# Patient Record
Sex: FEMALE | URBAN - METROPOLITAN AREA
[De-identification: names, ages, dates, MRNs, and addresses within clinical notes are randomized per-mention and may not be internally consistent; named-entity substitution may affect disease eponyms.]

---

## 2024-09-24 ENCOUNTER — DOCUMENTATION (OUTPATIENT)
Dept: ENDOCRINOLOGY | Facility: CLINIC | Age: 29
End: 2024-09-24
Payer: MEDICAID

## 2024-09-24 ENCOUNTER — PREP FOR PROCEDURE (OUTPATIENT)
Dept: ENDOCRINOLOGY | Facility: CLINIC | Age: 29
End: 2024-09-24
Payer: MEDICAID

## 2024-09-24 DIAGNOSIS — D25.9 UTERINE LEIOMYOMA, UNSPECIFIED LOCATION: ICD-10-CM

## 2024-09-24 DIAGNOSIS — Z01.812 ENCOUNTER FOR PREPROCEDURAL LABORATORY EXAMINATION: ICD-10-CM

## 2024-09-24 DIAGNOSIS — D25.0 SUBMUCOUS MYOMA OF UTERUS: Primary | ICD-10-CM

## 2024-09-24 DIAGNOSIS — Z01.812 ENCOUNTER FOR PREPROCEDURAL LABORATORY EXAMINATION: Primary | ICD-10-CM

## 2024-09-24 DIAGNOSIS — Z31.41 FERTILITY TESTING: Primary | ICD-10-CM

## 2024-09-24 RX ORDER — GABAPENTIN 600 MG/1
600 TABLET ORAL ONCE
OUTPATIENT
Start: 2024-09-24 | End: 2024-09-24

## 2024-09-24 RX ORDER — ACETAMINOPHEN 325 MG/1
975 TABLET ORAL ONCE
OUTPATIENT
Start: 2024-09-24 | End: 2024-09-24

## 2024-09-24 RX ORDER — SODIUM CHLORIDE, SODIUM LACTATE, POTASSIUM CHLORIDE, CALCIUM CHLORIDE 600; 310; 30; 20 MG/100ML; MG/100ML; MG/100ML; MG/100ML
20 INJECTION, SOLUTION INTRAVENOUS CONTINUOUS
OUTPATIENT
Start: 2024-09-24

## 2024-09-24 RX ORDER — CELECOXIB 400 MG/1
400 CAPSULE ORAL ONCE
OUTPATIENT
Start: 2024-09-24 | End: 2024-09-24

## 2024-09-24 NOTE — PROGRESS NOTES
Trinh Jasso [phone]     : 1995    2024    Telephone call to patient. She connected with Betty Torres to review her case and has relatives in [place] - she is interested in a female JOHNNIE perspective.   Received MRI report received via email. She has a large submucous fibroid and she really does not want a laparoscopic surgery.   Has been rx'd Lysteda - reviewed that this does not cause fibroid to regress. She has not taken it.     30yo  (hx SAB x2, one this past ). No hx D&C. Heavy bleeding with her SAB.   Hx  x 3- conceived coming off OCPs.   Has noted increasing bleeding since last summer. She is currently using a diaphragm and spermicide.   PMH: Healthy  PSH: None  All: reaction to dicloxacillin - never officially tested for PCN allergy  Meds: None   BMI: 5'4'' and 150 lbs- has noted some weight gain recently, has never been tested for PCOS    PLAN  Reviewed staged procedure if she desires to avoid laparoscopy.   Reviewed one stop with HS and LS versus staged HS x 2- will place surgery orders for hysteroscopic myomectomy and see whether insurance will cover at .   Reviewed  or Friday options- she will consider  Rejoni clinical trial for adhesion prevention. Will ask study coordinators to reach out. Will do SIS day before her surgery for preop planning. Orders placed.     Mariana Cornejo MD      On Aug 28, 2024, at 1:14?PM, Dat Doss <[email or web address]> wrote:  ?   Dear Dr. Cornejo, Hope all is well with you and your summer went nicely. This patient has a fibroid that her doctor in [place] wants her to take out vaginally if possible and if not laparoscopically. I would like her to get a second opinion  Dear Dr. Cornejo,     Hope all is well with you and your summer went nicely. This patient has a fibroid that her doctor in [place] wants her to take out vaginally if possible and if not laparoscopically. I would like her to get a second opinion from you and  possibly have you do the surgery as to preserve her chances of fertility as much as possible. I am sending you her mri images in another email as well as the report. Can you take a look and let me know if this is a case that you are willing to take on and have her come to [place]? Thank  you and have a great day. Please be on the lookout for the two additional emails.  Chen Mcclure

## 2024-09-25 PROBLEM — D25.0 SUBMUCOUS MYOMA OF UTERUS: Status: ACTIVE | Noted: 2024-09-24

## 2024-10-02 ENCOUNTER — TELEPHONE (OUTPATIENT)
Dept: ENDOCRINOLOGY | Facility: CLINIC | Age: 29
End: 2024-10-02

## 2024-10-02 NOTE — TELEPHONE ENCOUNTER
Called the patient there was no answer Left a message to call the office    NICOLAS CHOWDHURY 10/02/24 8:13 AM

## 2024-10-07 ENCOUNTER — TELEPHONE (OUTPATIENT)
Dept: ENDOCRINOLOGY | Facility: CLINIC | Age: 29
End: 2024-10-07
Payer: COMMERCIAL

## 2024-10-07 NOTE — TELEPHONE ENCOUNTER
Caller: Trinh  Reason for call: Returning nurse Vanna CARRANZA call from 10/3/24 please call her back  Notes:

## 2024-10-07 NOTE — TELEPHONE ENCOUNTER
Called the patient she verified her name and date of birth I advised we do not who called Patient verbalized an understanding    NICOLAS CHOWDHURY 10/07/24 10:27 AM

## 2024-10-16 ENCOUNTER — DOCUMENTATION (OUTPATIENT)
Dept: OBSTETRICS AND GYNECOLOGY | Facility: CLINIC | Age: 29
End: 2024-10-16
Payer: COMMERCIAL

## 2024-10-16 NOTE — RESEARCH NOTES
Patient consented to participate in Juveena Hydrogel study on 10/10/24. Patient indicated that she wanted her primary care physician to be made aware of her study participation. JHOAN Bass of [employer] in [place] was sent a message through [employer] website on 10/16/24 notifying her of the patient's research participation.

## 2024-10-28 DIAGNOSIS — Z00.6 RESEARCH EXAM: ICD-10-CM

## 2024-10-31 ENCOUNTER — OFFICE VISIT (OUTPATIENT)
Dept: ENDOCRINOLOGY | Facility: CLINIC | Age: 29
End: 2024-10-31
Payer: COMMERCIAL

## 2024-10-31 ENCOUNTER — LAB (OUTPATIENT)
Dept: LAB | Facility: LAB | Age: 29
End: 2024-10-31
Payer: COMMERCIAL

## 2024-10-31 DIAGNOSIS — Z00.6 RESEARCH EXAM: ICD-10-CM

## 2024-10-31 DIAGNOSIS — Z01.812 ENCOUNTER FOR PREPROCEDURAL LABORATORY EXAMINATION: ICD-10-CM

## 2024-10-31 DIAGNOSIS — D25.9 UTERINE LEIOMYOMA, UNSPECIFIED LOCATION: Primary | ICD-10-CM

## 2024-10-31 LAB
ALBUMIN SERPL BCP-MCNC: 4.5 G/DL (ref 3.4–5)
ALP SERPL-CCNC: 88 U/L (ref 33–110)
ALT SERPL W P-5'-P-CCNC: 22 U/L (ref 7–45)
ANION GAP SERPL CALC-SCNC: 8 MMOL/L (ref 10–20)
AST SERPL W P-5'-P-CCNC: 20 U/L (ref 9–39)
BASOPHILS # BLD AUTO: 0.04 X10*3/UL (ref 0–0.1)
BASOPHILS NFR BLD AUTO: 0.5 %
BILIRUB SERPL-MCNC: 0.7 MG/DL (ref 0–1.2)
BUN SERPL-MCNC: 11 MG/DL (ref 6–23)
CALCIUM SERPL-MCNC: 9 MG/DL (ref 8.6–10.3)
CHLORIDE SERPL-SCNC: 104 MMOL/L (ref 98–107)
CO2 SERPL-SCNC: 31 MMOL/L (ref 21–32)
CREAT SERPL-MCNC: 0.69 MG/DL (ref 0.5–1.05)
EGFRCR SERPLBLD CKD-EPI 2021: >90 ML/MIN/1.73M*2
EOSINOPHIL # BLD AUTO: 0.09 X10*3/UL (ref 0–0.7)
EOSINOPHIL NFR BLD AUTO: 1.2 %
ERYTHROCYTE [DISTWIDTH] IN BLOOD BY AUTOMATED COUNT: 12.7 % (ref 11.5–14.5)
GLUCOSE SERPL-MCNC: 116 MG/DL (ref 74–99)
HCT VFR BLD AUTO: 38.9 % (ref 36–46)
HGB BLD-MCNC: 12.7 G/DL (ref 12–16)
IMM GRANULOCYTES # BLD AUTO: 0.03 X10*3/UL (ref 0–0.7)
IMM GRANULOCYTES NFR BLD AUTO: 0.4 % (ref 0–0.9)
LYMPHOCYTES # BLD AUTO: 1.75 X10*3/UL (ref 1.2–4.8)
LYMPHOCYTES NFR BLD AUTO: 23.6 %
MCH RBC QN AUTO: 28.9 PG (ref 26–34)
MCHC RBC AUTO-ENTMCNC: 32.6 G/DL (ref 32–36)
MCV RBC AUTO: 89 FL (ref 80–100)
MONOCYTES # BLD AUTO: 0.46 X10*3/UL (ref 0.1–1)
MONOCYTES NFR BLD AUTO: 6.2 %
NEUTROPHILS # BLD AUTO: 5.05 X10*3/UL (ref 1.2–7.7)
NEUTROPHILS NFR BLD AUTO: 68.1 %
NRBC BLD-RTO: 0 /100 WBCS (ref 0–0)
PLATELET # BLD AUTO: 233 X10*3/UL (ref 150–450)
POTASSIUM SERPL-SCNC: 3.9 MMOL/L (ref 3.5–5.3)
PREGNANCY TEST URINE, POC: NEGATIVE
PROT SERPL-MCNC: 7.1 G/DL (ref 6.4–8.2)
RBC # BLD AUTO: 4.39 X10*6/UL (ref 4–5.2)
SODIUM SERPL-SCNC: 139 MMOL/L (ref 136–145)
WBC # BLD AUTO: 7.4 X10*3/UL (ref 4.4–11.3)

## 2024-10-31 PROCEDURE — 58340 CATHETER FOR HYSTEROGRAPHY: CPT

## 2024-10-31 PROCEDURE — 76831 ECHO EXAM UTERUS: CPT

## 2024-10-31 PROCEDURE — 76831 ECHO EXAM UTERUS: CPT | Performed by: STUDENT IN AN ORGANIZED HEALTH CARE EDUCATION/TRAINING PROGRAM

## 2024-10-31 PROCEDURE — 85025 COMPLETE CBC W/AUTO DIFF WBC: CPT

## 2024-10-31 PROCEDURE — 80053 COMPREHEN METABOLIC PANEL: CPT

## 2024-11-01 ENCOUNTER — PHARMACY VISIT (OUTPATIENT)
Dept: PHARMACY | Facility: CLINIC | Age: 29
End: 2024-11-01
Payer: COMMERCIAL

## 2024-11-01 ENCOUNTER — ANESTHESIA (OUTPATIENT)
Dept: OPERATING ROOM | Facility: HOSPITAL | Age: 29
End: 2024-11-01
Payer: COMMERCIAL

## 2024-11-01 ENCOUNTER — HOSPITAL ENCOUNTER (OUTPATIENT)
Facility: HOSPITAL | Age: 29
Setting detail: OUTPATIENT SURGERY
Discharge: HOME | End: 2024-11-01
Attending: OBSTETRICS & GYNECOLOGY | Admitting: OBSTETRICS & GYNECOLOGY
Payer: COMMERCIAL

## 2024-11-01 ENCOUNTER — ANESTHESIA EVENT (OUTPATIENT)
Dept: OPERATING ROOM | Facility: HOSPITAL | Age: 29
End: 2024-11-01
Payer: COMMERCIAL

## 2024-11-01 VITALS
RESPIRATION RATE: 16 BRPM | SYSTOLIC BLOOD PRESSURE: 116 MMHG | TEMPERATURE: 97 F | HEIGHT: 65 IN | BODY MASS INDEX: 26.67 KG/M2 | HEART RATE: 62 BPM | OXYGEN SATURATION: 96 % | WEIGHT: 160.05 LBS | DIASTOLIC BLOOD PRESSURE: 68 MMHG

## 2024-11-01 DIAGNOSIS — G89.18 POST-OP PAIN: ICD-10-CM

## 2024-11-01 DIAGNOSIS — D25.0 SUBMUCOUS MYOMA OF UTERUS: Primary | ICD-10-CM

## 2024-11-01 LAB — PREGNANCY TEST URINE, POC: NEGATIVE

## 2024-11-01 PROCEDURE — 3700000002 HC GENERAL ANESTHESIA TIME - EACH INCREMENTAL 1 MINUTE: Performed by: OBSTETRICS & GYNECOLOGY

## 2024-11-01 PROCEDURE — RXMED WILLOW AMBULATORY MEDICATION CHARGE

## 2024-11-01 PROCEDURE — 2500000004 HC RX 250 GENERAL PHARMACY W/ HCPCS (ALT 636 FOR OP/ED): Performed by: ANESTHESIOLOGY

## 2024-11-01 PROCEDURE — 7100000009 HC PHASE TWO TIME - INITIAL BASE CHARGE: Performed by: OBSTETRICS & GYNECOLOGY

## 2024-11-01 PROCEDURE — 7100000010 HC PHASE TWO TIME - EACH INCREMENTAL 1 MINUTE: Performed by: OBSTETRICS & GYNECOLOGY

## 2024-11-01 PROCEDURE — 7100000001 HC RECOVERY ROOM TIME - INITIAL BASE CHARGE: Performed by: OBSTETRICS & GYNECOLOGY

## 2024-11-01 PROCEDURE — 3600000003 HC OR TIME - INITIAL BASE CHARGE - PROCEDURE LEVEL THREE: Performed by: OBSTETRICS & GYNECOLOGY

## 2024-11-01 PROCEDURE — 2500000004 HC RX 250 GENERAL PHARMACY W/ HCPCS (ALT 636 FOR OP/ED): Performed by: OBSTETRICS & GYNECOLOGY

## 2024-11-01 PROCEDURE — 2720000007 HC OR 272 NO HCPCS: Performed by: OBSTETRICS & GYNECOLOGY

## 2024-11-01 PROCEDURE — 3700000001 HC GENERAL ANESTHESIA TIME - INITIAL BASE CHARGE: Performed by: OBSTETRICS & GYNECOLOGY

## 2024-11-01 PROCEDURE — 2500000005 HC RX 250 GENERAL PHARMACY W/O HCPCS: Performed by: OBSTETRICS & GYNECOLOGY

## 2024-11-01 PROCEDURE — 2500000004 HC RX 250 GENERAL PHARMACY W/ HCPCS (ALT 636 FOR OP/ED): Mod: JZ | Performed by: OBSTETRICS & GYNECOLOGY

## 2024-11-01 PROCEDURE — 7100000002 HC RECOVERY ROOM TIME - EACH INCREMENTAL 1 MINUTE: Performed by: OBSTETRICS & GYNECOLOGY

## 2024-11-01 PROCEDURE — 58561 HYSTEROSCOPY REMOVE MYOMA: CPT | Performed by: OBSTETRICS & GYNECOLOGY

## 2024-11-01 PROCEDURE — 81025 URINE PREGNANCY TEST: CPT | Performed by: OBSTETRICS & GYNECOLOGY

## 2024-11-01 PROCEDURE — 2500000001 HC RX 250 WO HCPCS SELF ADMINISTERED DRUGS (ALT 637 FOR MEDICARE OP): Performed by: OBSTETRICS & GYNECOLOGY

## 2024-11-01 PROCEDURE — 3600000008 HC OR TIME - EACH INCREMENTAL 1 MINUTE - PROCEDURE LEVEL THREE: Performed by: OBSTETRICS & GYNECOLOGY

## 2024-11-01 PROCEDURE — 2500000004 HC RX 250 GENERAL PHARMACY W/ HCPCS (ALT 636 FOR OP/ED)

## 2024-11-01 RX ORDER — GABAPENTIN 300 MG/1
600 CAPSULE ORAL ONCE
Status: COMPLETED | OUTPATIENT
Start: 2024-11-01 | End: 2024-11-01

## 2024-11-01 RX ORDER — ONDANSETRON HYDROCHLORIDE 2 MG/ML
INJECTION, SOLUTION INTRAVENOUS AS NEEDED
Status: DISCONTINUED | OUTPATIENT
Start: 2024-11-01 | End: 2024-11-01

## 2024-11-01 RX ORDER — SODIUM CHLORIDE, SODIUM LACTATE, POTASSIUM CHLORIDE, CALCIUM CHLORIDE 600; 310; 30; 20 MG/100ML; MG/100ML; MG/100ML; MG/100ML
20 INJECTION, SOLUTION INTRAVENOUS CONTINUOUS
Status: DISCONTINUED | OUTPATIENT
Start: 2024-11-01 | End: 2024-11-01 | Stop reason: HOSPADM

## 2024-11-01 RX ORDER — FENTANYL CITRATE 50 UG/ML
INJECTION, SOLUTION INTRAMUSCULAR; INTRAVENOUS AS NEEDED
Status: DISCONTINUED | OUTPATIENT
Start: 2024-11-01 | End: 2024-11-01

## 2024-11-01 RX ORDER — FENTANYL CITRATE 50 UG/ML
12.5 INJECTION, SOLUTION INTRAMUSCULAR; INTRAVENOUS EVERY 5 MIN PRN
Status: DISCONTINUED | OUTPATIENT
Start: 2024-11-01 | End: 2024-11-01 | Stop reason: HOSPADM

## 2024-11-01 RX ORDER — ROCURONIUM BROMIDE 10 MG/ML
INJECTION, SOLUTION INTRAVENOUS AS NEEDED
Status: DISCONTINUED | OUTPATIENT
Start: 2024-11-01 | End: 2024-11-01

## 2024-11-01 RX ORDER — IBUPROFEN 600 MG/1
600 TABLET ORAL EVERY 6 HOURS PRN
Qty: 20 TABLET | Refills: 0 | Status: SHIPPED | OUTPATIENT
Start: 2024-11-01

## 2024-11-01 RX ORDER — DROPERIDOL 2.5 MG/ML
0.62 INJECTION, SOLUTION INTRAMUSCULAR; INTRAVENOUS ONCE AS NEEDED
Status: DISCONTINUED | OUTPATIENT
Start: 2024-11-01 | End: 2024-11-01 | Stop reason: HOSPADM

## 2024-11-01 RX ORDER — FENTANYL CITRATE 50 UG/ML
50 INJECTION, SOLUTION INTRAMUSCULAR; INTRAVENOUS EVERY 5 MIN PRN
Status: DISCONTINUED | OUTPATIENT
Start: 2024-11-01 | End: 2024-11-01 | Stop reason: HOSPADM

## 2024-11-01 RX ORDER — KETOROLAC TROMETHAMINE 30 MG/ML
INJECTION, SOLUTION INTRAMUSCULAR; INTRAVENOUS AS NEEDED
Status: DISCONTINUED | OUTPATIENT
Start: 2024-11-01 | End: 2024-11-01

## 2024-11-01 RX ORDER — CELECOXIB 200 MG/1
400 CAPSULE ORAL ONCE
Status: COMPLETED | OUTPATIENT
Start: 2024-11-01 | End: 2024-11-01

## 2024-11-01 RX ORDER — ACETAMINOPHEN 325 MG/1
975 TABLET ORAL ONCE
Status: COMPLETED | OUTPATIENT
Start: 2024-11-01 | End: 2024-11-01

## 2024-11-01 RX ORDER — ACETAMINOPHEN 325 MG/1
650 TABLET ORAL EVERY 6 HOURS PRN
Qty: 20 TABLET | Refills: 0 | Status: SHIPPED | OUTPATIENT
Start: 2024-11-01

## 2024-11-01 RX ORDER — HYDROMORPHONE HYDROCHLORIDE 1 MG/ML
INJECTION, SOLUTION INTRAMUSCULAR; INTRAVENOUS; SUBCUTANEOUS AS NEEDED
Status: DISCONTINUED | OUTPATIENT
Start: 2024-11-01 | End: 2024-11-01

## 2024-11-01 RX ORDER — MIDAZOLAM HYDROCHLORIDE 1 MG/ML
INJECTION, SOLUTION INTRAMUSCULAR; INTRAVENOUS AS NEEDED
Status: DISCONTINUED | OUTPATIENT
Start: 2024-11-01 | End: 2024-11-01

## 2024-11-01 RX ORDER — LIDOCAINE HYDROCHLORIDE 10 MG/ML
0.1 INJECTION, SOLUTION EPIDURAL; INFILTRATION; INTRACAUDAL; PERINEURAL ONCE
Status: DISCONTINUED | OUTPATIENT
Start: 2024-11-01 | End: 2024-11-01 | Stop reason: HOSPADM

## 2024-11-01 RX ORDER — ESCITALOPRAM OXALATE 10 MG/1
15 TABLET ORAL DAILY
COMMUNITY

## 2024-11-01 RX ORDER — FENTANYL CITRATE 50 UG/ML
25 INJECTION, SOLUTION INTRAMUSCULAR; INTRAVENOUS EVERY 5 MIN PRN
Status: DISCONTINUED | OUTPATIENT
Start: 2024-11-01 | End: 2024-11-01 | Stop reason: HOSPADM

## 2024-11-01 RX ORDER — SODIUM CHLORIDE, SODIUM LACTATE, POTASSIUM CHLORIDE, CALCIUM CHLORIDE 600; 310; 30; 20 MG/100ML; MG/100ML; MG/100ML; MG/100ML
100 INJECTION, SOLUTION INTRAVENOUS CONTINUOUS
Status: DISCONTINUED | OUTPATIENT
Start: 2024-11-01 | End: 2024-11-01 | Stop reason: HOSPADM

## 2024-11-01 RX ORDER — LIDOCAINE HYDROCHLORIDE 20 MG/ML
INJECTION, SOLUTION INFILTRATION; PERINEURAL AS NEEDED
Status: DISCONTINUED | OUTPATIENT
Start: 2024-11-01 | End: 2024-11-01

## 2024-11-01 RX ORDER — ONDANSETRON HYDROCHLORIDE 2 MG/ML
4 INJECTION, SOLUTION INTRAVENOUS ONCE AS NEEDED
Status: COMPLETED | OUTPATIENT
Start: 2024-11-01 | End: 2024-11-01

## 2024-11-01 RX ORDER — PROPOFOL 10 MG/ML
INJECTION, EMULSION INTRAVENOUS AS NEEDED
Status: DISCONTINUED | OUTPATIENT
Start: 2024-11-01 | End: 2024-11-01

## 2024-11-01 RX ORDER — POLYETHYLENE GLYCOL 3350 17 G/17G
17 POWDER, FOR SOLUTION ORAL DAILY PRN
Qty: 10 PACKET | Refills: 0 | Status: SHIPPED | OUTPATIENT
Start: 2024-11-01

## 2024-11-01 RX ORDER — ONDANSETRON 4 MG/1
4 TABLET, FILM COATED ORAL EVERY 6 HOURS PRN
Qty: 20 TABLET | Refills: 0 | Status: SHIPPED | OUTPATIENT
Start: 2024-11-01

## 2024-11-01 RX ORDER — CLONAZEPAM 0.5 MG/1
0.5 TABLET ORAL 2 TIMES DAILY
COMMUNITY

## 2024-11-01 ASSESSMENT — PAIN SCALES - GENERAL
PAINLEVEL_OUTOF10: 1
PAINLEVEL_OUTOF10: 0 - NO PAIN
PAINLEVEL_OUTOF10: 2
PAINLEVEL_OUTOF10: 1
PAINLEVEL_OUTOF10: 1
PAINLEVEL_OUTOF10: 0 - NO PAIN
PAINLEVEL_OUTOF10: 2

## 2024-11-01 ASSESSMENT — COLUMBIA-SUICIDE SEVERITY RATING SCALE - C-SSRS
1. IN THE PAST MONTH, HAVE YOU WISHED YOU WERE DEAD OR WISHED YOU COULD GO TO SLEEP AND NOT WAKE UP?: NO
6. HAVE YOU EVER DONE ANYTHING, STARTED TO DO ANYTHING, OR PREPARED TO DO ANYTHING TO END YOUR LIFE?: NO
2. HAVE YOU ACTUALLY HAD ANY THOUGHTS OF KILLING YOURSELF?: NO

## 2024-11-01 ASSESSMENT — PAIN - FUNCTIONAL ASSESSMENT
PAIN_FUNCTIONAL_ASSESSMENT: 0-10

## 2024-11-05 ENCOUNTER — PREP FOR PROCEDURE (OUTPATIENT)
Dept: ENDOCRINOLOGY | Facility: CLINIC | Age: 29
End: 2024-11-05
Payer: COMMERCIAL

## 2024-11-05 DIAGNOSIS — D25.0 SUBMUCOUS MYOMA OF UTERUS: Primary | ICD-10-CM

## 2024-11-05 RX ORDER — GABAPENTIN 600 MG/1
600 TABLET ORAL ONCE
OUTPATIENT
Start: 2024-11-05 | End: 2024-11-05

## 2024-11-05 RX ORDER — CELECOXIB 400 MG/1
400 CAPSULE ORAL ONCE
OUTPATIENT
Start: 2024-11-05 | End: 2024-11-05

## 2024-11-05 RX ORDER — ACETAMINOPHEN 325 MG/1
975 TABLET ORAL ONCE
OUTPATIENT
Start: 2024-11-05 | End: 2024-11-05

## 2024-11-05 ASSESSMENT — PAIN SCALES - GENERAL: PAINLEVEL_OUTOF10: 0 - NO PAIN

## 2024-11-07 LAB
LABORATORY COMMENT REPORT: NORMAL
PATH REPORT.FINAL DX SPEC: NORMAL
PATH REPORT.GROSS SPEC: NORMAL
PATH REPORT.RELEVANT HX SPEC: NORMAL
PATH REPORT.TOTAL CANCER: NORMAL

## 2024-11-15 ENCOUNTER — DOCUMENTATION (OUTPATIENT)
Dept: ENDOCRINOLOGY | Facility: CLINIC | Age: 29
End: 2024-11-15
Payer: COMMERCIAL

## 2024-11-15 NOTE — PROGRESS NOTES
Telephone call:  She recovered well by the one week. She has had some bleeding/spotting for about one week. She did not take OCPs- she is going to the [facility] and is aware that she cannot get pregnant this month. Plans to use diaphragm. Surgery is Jan 10th. She still would like to be in the study if possible. Will send a note to the study team about her being in the trial.   Aware that a third surgery maybe needed but we are hoping this second surgery is a completion surgery. Does not need to come in early for an office visit or an US as we will do an US intraoperative.   Mariana Cornejo 11/15/24 3:39 PM

## 2024-11-26 ENCOUNTER — TELEPHONE (OUTPATIENT)
Dept: ENDOCRINOLOGY | Facility: CLINIC | Age: 29
End: 2024-11-26
Payer: COMMERCIAL

## 2024-11-26 NOTE — TELEPHONE ENCOUNTER
Returned patient's call. Patient has questions about the expectations of her taking birth control and method of birth control prior to and after her fibroid removal surgery.     Patient's current method of birth control is spermicide and diaphragm. Patient states that in an email conversation with Dr. Cornejo that this will not be enough to absolutely ensure patient will not get pregnant prior to surgery in December. So the options were for the patient to abstan prior to surgery or to use OCPs to prevent pregnancy. Before patient makes a decision on what method she would prefer she has some questions-she would like to avoid OCPs but may choose to use them depending on what expectations will be after her surgery.     Patient would like to know how long after her surgery will she need to use protection from pregnancy. And if after the surgery is the spermicide and diaphragm sufficient or will she be expected to use birth control pills or abstain at that point. After she knows this information she will decide on if she will use OCPs or if she will just abstain prior to the surgery because if she is going to need long term birth control after her surgery she may opt to just start them now. Will bring to office hours to discuss with Dr. Cornejo.     KAILEE CAMILO on 11/26/24 at 3:25 PM.    Discussed with Dr. Cornejo- pt to use either ocps or abstain for protection from pregnancy. Patient will need one of these methods for two months following surgery. Called patient and left voicemail with update and sent [facility] message as well.   KAILEE CAMILO on 11/26/24 at 4:18 PM.

## 2024-12-10 DIAGNOSIS — N93.9 ABNORMAL UTERINE BLEEDING: Primary | ICD-10-CM

## 2024-12-10 RX ORDER — ETONOGESTREL AND ETHINYL ESTRADIOL VAGINAL RING .015; .12 MG/D; MG/D
RING VAGINAL
Qty: 1 EACH | Refills: 12 | Status: SHIPPED | OUTPATIENT
Start: 2024-12-10 | End: 2025-12-10

## 2024-12-17 ENCOUNTER — PATIENT MESSAGE (OUTPATIENT)
Dept: ENDOCRINOLOGY | Facility: CLINIC | Age: 29
End: 2024-12-17
Payer: COMMERCIAL

## 2024-12-17 ENCOUNTER — TELEPHONE (OUTPATIENT)
Dept: ENDOCRINOLOGY | Facility: CLINIC | Age: 29
End: 2024-12-17
Payer: COMMERCIAL

## 2024-12-17 NOTE — TELEPHONE ENCOUNTER
Reason for call: Call Back  Notes: Pt has questions regarding her birth control and how to insert it. She needs to start today as she will be having a procedure done on Friday.

## 2024-12-17 NOTE — TELEPHONE ENCOUNTER
Patient calling in regarding nuvaring, started period today and needs to insert ring and unsure how to.   Walked patient through insertion.   Patient instructed to wash hands, hold nuvaring between thumb and index finger and press sides together.   Insert the folded ring into the vagina and gently push it further up into the vagina using finger.  She will keep the same ring inserted for 3 weeks then remove for one week and then reinsert a new ring.   Patient also has questions regarding being on her period at the time of surgery.   Discussed with patient being on her period should not be an issue per Dr. Bryan.   Patient verbalized understanding dn all questions and concerns addressed.

## 2024-12-19 ENCOUNTER — ANESTHESIA EVENT (OUTPATIENT)
Dept: OPERATING ROOM | Facility: HOSPITAL | Age: 29
End: 2024-12-19
Payer: COMMERCIAL

## 2024-12-19 NOTE — HOSPITAL COURSE
Gynecologic Surgery History and Physical      Subjective  Trinh Jsaso is a 29 y.o. with a large submucosal fibroid presenting for second hysteroscopy, myomectomy, any other indicated procedures. Had hysteroscopic resection in November with approximately 70% of fibroid resected prior to reaching maximum fluid deficit.     PMH: anxiety  PSH: hysteroscopic myomectomy  Imaging: US sonohysterogram 10/2024  Unremarkable pelvic survey. Right lateral wall uterine fibroid noted. Upon instillation of saline, the fibroid appears to be FIGO type 2 fibroid with minimal submucosal component     Obstetrical History    (hx SAB x2, one this past ). No hx D&C. Heavy bleeding with her SAB.   Hx  x 3- conceived coming off OCPs.      Social History  Social History           Tobacco Use    Smoking status: Never    Smokeless tobacco: Never   Substance Use Topics    Alcohol use: Never          Substance and Sexual Activity   Drug Use Never         Allergies  Penicillin      Medications  Prescriptions Prior to Admission           Medications Prior to Admission   Medication Sig Dispense Refill Last Dose/Taking    escitalopram (Lexapro) 10 mg tablet Take 1.5 tablets (15 mg) by mouth once daily.     10/31/2024    clonazePAM (KlonoPIN) 0.5 mg tablet Take 1 tablet (0.5 mg) by mouth 2 times a day.                  ROS: negative except per HPI        Objective  There were no vitals filed for this visit.    Physical Examination  General: no acute distress  HEENT: normocephalic, atraumatic  Heart: warm and well perfused  Lungs: breathing comfortably on room air  Abdomen: deferred to OR  Extremities: moving all extremities  Neuro: awake and conversant  Psych: appropriate mood and affect     Lab Review  Lab Results   Component Value Date    WBC 7.4 10/31/2024    HGB 12.7 10/31/2024    HCT 38.9 10/31/2024    MCV 89 10/31/2024     10/31/2024     Lab Results   Component Value Date    GLUCOSE 116 (H) 10/31/2024    CALCIUM 9.0  10/31/2024     10/31/2024    K 3.9 10/31/2024    CO2 31 10/31/2024     10/31/2024    BUN 11 10/31/2024    CREATININE 0.69 10/31/2024      Assessment/Plan     Trinh Jasso is a 29 y.o. with large fibroid presenting for scheduled surgery.     Plan to proceed with hysteroscopy, myomectomy, any other indicated procedures, including possible laparoscopic myomectomy  Surgical consent was reviewed. The risks of surgery were discussed including: bleeding (including need for blood transfusion in life-threatening situations; risks of transfusion), infection, damage to surrounding organs. The patient had the opportunity to answer questions and desired to proceed with surgery following our discussion. Both verbal and written consents were obtained.        To be Seen and discussed with Dr. Cornejo

## 2024-12-20 ENCOUNTER — ANESTHESIA (OUTPATIENT)
Dept: OPERATING ROOM | Facility: HOSPITAL | Age: 29
End: 2024-12-20
Payer: COMMERCIAL

## 2024-12-20 ENCOUNTER — PHARMACY VISIT (OUTPATIENT)
Dept: PHARMACY | Facility: CLINIC | Age: 29
End: 2024-12-20
Payer: COMMERCIAL

## 2024-12-20 ENCOUNTER — HOSPITAL ENCOUNTER (OUTPATIENT)
Facility: HOSPITAL | Age: 29
Setting detail: OUTPATIENT SURGERY
Discharge: HOME | End: 2024-12-20
Attending: OBSTETRICS & GYNECOLOGY | Admitting: OBSTETRICS & GYNECOLOGY
Payer: COMMERCIAL

## 2024-12-20 VITALS
WEIGHT: 159.39 LBS | SYSTOLIC BLOOD PRESSURE: 121 MMHG | DIASTOLIC BLOOD PRESSURE: 68 MMHG | BODY MASS INDEX: 27.21 KG/M2 | RESPIRATION RATE: 12 BRPM | OXYGEN SATURATION: 99 % | HEART RATE: 83 BPM | HEIGHT: 64 IN | TEMPERATURE: 96.8 F

## 2024-12-20 DIAGNOSIS — D25.0 SUBMUCOUS MYOMA OF UTERUS: Primary | ICD-10-CM

## 2024-12-20 DIAGNOSIS — Z98.890 POST-OPERATIVE STATE: ICD-10-CM

## 2024-12-20 LAB — PREGNANCY TEST URINE, POC: NEGATIVE

## 2024-12-20 PROCEDURE — 7100000009 HC PHASE TWO TIME - INITIAL BASE CHARGE: Performed by: OBSTETRICS & GYNECOLOGY

## 2024-12-20 PROCEDURE — 2720000007 HC OR 272 NO HCPCS: Performed by: OBSTETRICS & GYNECOLOGY

## 2024-12-20 PROCEDURE — 2500000004 HC RX 250 GENERAL PHARMACY W/ HCPCS (ALT 636 FOR OP/ED): Performed by: ANESTHESIOLOGIST ASSISTANT

## 2024-12-20 PROCEDURE — 3700000002 HC GENERAL ANESTHESIA TIME - EACH INCREMENTAL 1 MINUTE: Performed by: OBSTETRICS & GYNECOLOGY

## 2024-12-20 PROCEDURE — 2500000004 HC RX 250 GENERAL PHARMACY W/ HCPCS (ALT 636 FOR OP/ED): Mod: JZ | Performed by: OBSTETRICS & GYNECOLOGY

## 2024-12-20 PROCEDURE — 81025 URINE PREGNANCY TEST: CPT | Performed by: OBSTETRICS & GYNECOLOGY

## 2024-12-20 PROCEDURE — 7100000002 HC RECOVERY ROOM TIME - EACH INCREMENTAL 1 MINUTE: Performed by: OBSTETRICS & GYNECOLOGY

## 2024-12-20 PROCEDURE — 3600000003 HC OR TIME - INITIAL BASE CHARGE - PROCEDURE LEVEL THREE: Performed by: OBSTETRICS & GYNECOLOGY

## 2024-12-20 PROCEDURE — 3700000001 HC GENERAL ANESTHESIA TIME - INITIAL BASE CHARGE: Performed by: OBSTETRICS & GYNECOLOGY

## 2024-12-20 PROCEDURE — 2500000001 HC RX 250 WO HCPCS SELF ADMINISTERED DRUGS (ALT 637 FOR MEDICARE OP): Performed by: OBSTETRICS & GYNECOLOGY

## 2024-12-20 PROCEDURE — 7100000010 HC PHASE TWO TIME - EACH INCREMENTAL 1 MINUTE: Performed by: OBSTETRICS & GYNECOLOGY

## 2024-12-20 PROCEDURE — 2500000005 HC RX 250 GENERAL PHARMACY W/O HCPCS: Performed by: OBSTETRICS & GYNECOLOGY

## 2024-12-20 PROCEDURE — 2500000005 HC RX 250 GENERAL PHARMACY W/O HCPCS: Performed by: ANESTHESIOLOGIST ASSISTANT

## 2024-12-20 PROCEDURE — 7100000001 HC RECOVERY ROOM TIME - INITIAL BASE CHARGE: Performed by: OBSTETRICS & GYNECOLOGY

## 2024-12-20 PROCEDURE — 2500000004 HC RX 250 GENERAL PHARMACY W/ HCPCS (ALT 636 FOR OP/ED): Performed by: ANESTHESIOLOGY

## 2024-12-20 PROCEDURE — 3600000008 HC OR TIME - EACH INCREMENTAL 1 MINUTE - PROCEDURE LEVEL THREE: Performed by: OBSTETRICS & GYNECOLOGY

## 2024-12-20 PROCEDURE — RXMED WILLOW AMBULATORY MEDICATION CHARGE

## 2024-12-20 DEVICE — IMPLANTABLE DEVICE: Type: IMPLANTABLE DEVICE | Site: PELVIS | Status: FUNCTIONAL

## 2024-12-20 RX ORDER — PROPOFOL 10 MG/ML
INJECTION, EMULSION INTRAVENOUS AS NEEDED
Status: DISCONTINUED | OUTPATIENT
Start: 2024-12-20 | End: 2024-12-20

## 2024-12-20 RX ORDER — LIDOCAINE HYDROCHLORIDE 20 MG/ML
INJECTION, SOLUTION INFILTRATION; PERINEURAL AS NEEDED
Status: DISCONTINUED | OUTPATIENT
Start: 2024-12-20 | End: 2024-12-20

## 2024-12-20 RX ORDER — ROCURONIUM BROMIDE 10 MG/ML
INJECTION, SOLUTION INTRAVENOUS AS NEEDED
Status: DISCONTINUED | OUTPATIENT
Start: 2024-12-20 | End: 2024-12-20

## 2024-12-20 RX ORDER — NORETHINDRONE AND ETHINYL ESTRADIOL 0.5-0.035
KIT ORAL AS NEEDED
Status: DISCONTINUED | OUTPATIENT
Start: 2024-12-20 | End: 2024-12-20

## 2024-12-20 RX ORDER — ACETAMINOPHEN 325 MG/1
975 TABLET ORAL ONCE
Status: COMPLETED | OUTPATIENT
Start: 2024-12-20 | End: 2024-12-20

## 2024-12-20 RX ORDER — ACETAMINOPHEN 325 MG/1
650 TABLET ORAL EVERY 6 HOURS PRN
Qty: 20 TABLET | Refills: 0 | Status: SHIPPED | OUTPATIENT
Start: 2024-12-20

## 2024-12-20 RX ORDER — GABAPENTIN 300 MG/1
600 CAPSULE ORAL ONCE
Status: COMPLETED | OUTPATIENT
Start: 2024-12-20 | End: 2024-12-20

## 2024-12-20 RX ORDER — SODIUM CHLORIDE 0.9 G/100ML
IRRIGANT IRRIGATION AS NEEDED
Status: DISCONTINUED | OUTPATIENT
Start: 2024-12-20 | End: 2024-12-20 | Stop reason: HOSPADM

## 2024-12-20 RX ORDER — LIDOCAINE HYDROCHLORIDE 10 MG/ML
0.1 INJECTION, SOLUTION EPIDURAL; INFILTRATION; INTRACAUDAL; PERINEURAL ONCE
Status: DISCONTINUED | OUTPATIENT
Start: 2024-12-20 | End: 2024-12-20 | Stop reason: HOSPADM

## 2024-12-20 RX ORDER — PHENYLEPHRINE HCL IN 0.9% NACL 1 MG/10 ML
SYRINGE (ML) INTRAVENOUS AS NEEDED
Status: DISCONTINUED | OUTPATIENT
Start: 2024-12-20 | End: 2024-12-20

## 2024-12-20 RX ORDER — IBUPROFEN 600 MG/1
600 TABLET ORAL EVERY 6 HOURS PRN
Qty: 20 TABLET | Refills: 0 | Status: SHIPPED | OUTPATIENT
Start: 2024-12-20

## 2024-12-20 RX ORDER — POLYETHYLENE GLYCOL 3350 17 G/17G
17 POWDER, FOR SOLUTION ORAL DAILY PRN
Qty: 10 PACKET | Refills: 0 | Status: SHIPPED | OUTPATIENT
Start: 2024-12-20

## 2024-12-20 RX ORDER — KETOROLAC TROMETHAMINE 30 MG/ML
INJECTION, SOLUTION INTRAMUSCULAR; INTRAVENOUS AS NEEDED
Status: DISCONTINUED | OUTPATIENT
Start: 2024-12-20 | End: 2024-12-20

## 2024-12-20 RX ORDER — ONDANSETRON 4 MG/1
4 TABLET, FILM COATED ORAL EVERY 6 HOURS PRN
Qty: 20 TABLET | Refills: 0 | Status: SHIPPED | OUTPATIENT
Start: 2024-12-20

## 2024-12-20 RX ORDER — MIDAZOLAM HYDROCHLORIDE 1 MG/ML
INJECTION INTRAMUSCULAR; INTRAVENOUS AS NEEDED
Status: DISCONTINUED | OUTPATIENT
Start: 2024-12-20 | End: 2024-12-20

## 2024-12-20 RX ORDER — CELECOXIB 200 MG/1
400 CAPSULE ORAL ONCE
Status: COMPLETED | OUTPATIENT
Start: 2024-12-20 | End: 2024-12-20

## 2024-12-20 RX ORDER — ONDANSETRON HYDROCHLORIDE 2 MG/ML
INJECTION, SOLUTION INTRAVENOUS AS NEEDED
Status: DISCONTINUED | OUTPATIENT
Start: 2024-12-20 | End: 2024-12-20

## 2024-12-20 RX ORDER — SODIUM CHLORIDE, SODIUM LACTATE, POTASSIUM CHLORIDE, CALCIUM CHLORIDE 600; 310; 30; 20 MG/100ML; MG/100ML; MG/100ML; MG/100ML
100 INJECTION, SOLUTION INTRAVENOUS CONTINUOUS
Status: DISCONTINUED | OUTPATIENT
Start: 2024-12-20 | End: 2024-12-20 | Stop reason: HOSPADM

## 2024-12-20 RX ORDER — FENTANYL CITRATE 50 UG/ML
INJECTION, SOLUTION INTRAMUSCULAR; INTRAVENOUS AS NEEDED
Status: DISCONTINUED | OUTPATIENT
Start: 2024-12-20 | End: 2024-12-20

## 2024-12-20 RX ORDER — OXYCODONE HYDROCHLORIDE 5 MG/1
5 TABLET ORAL EVERY 4 HOURS PRN
Status: DISCONTINUED | OUTPATIENT
Start: 2024-12-20 | End: 2024-12-20 | Stop reason: HOSPADM

## 2024-12-20 RX ORDER — ACETAMINOPHEN 325 MG/1
650 TABLET ORAL EVERY 4 HOURS PRN
Status: DISCONTINUED | OUTPATIENT
Start: 2024-12-20 | End: 2024-12-20 | Stop reason: HOSPADM

## 2024-12-20 SDOH — HEALTH STABILITY: MENTAL HEALTH: CURRENT SMOKER: 0

## 2024-12-20 ASSESSMENT — PAIN - FUNCTIONAL ASSESSMENT
PAIN_FUNCTIONAL_ASSESSMENT: 0-10

## 2024-12-20 ASSESSMENT — COLUMBIA-SUICIDE SEVERITY RATING SCALE - C-SSRS
2. HAVE YOU ACTUALLY HAD ANY THOUGHTS OF KILLING YOURSELF?: NO
1. IN THE PAST MONTH, HAVE YOU WISHED YOU WERE DEAD OR WISHED YOU COULD GO TO SLEEP AND NOT WAKE UP?: NO
6. HAVE YOU EVER DONE ANYTHING, STARTED TO DO ANYTHING, OR PREPARED TO DO ANYTHING TO END YOUR LIFE?: NO

## 2024-12-20 ASSESSMENT — PAIN DESCRIPTION - DESCRIPTORS: DESCRIPTORS: CRAMPING

## 2024-12-20 ASSESSMENT — PAIN SCALES - GENERAL
PAINLEVEL_OUTOF10: 0 - NO PAIN
PAINLEVEL_OUTOF10: 5 - MODERATE PAIN
PAINLEVEL_OUTOF10: 3
PAINLEVEL_OUTOF10: 3
PAINLEVEL_OUTOF10: 0 - NO PAIN
PAINLEVEL_OUTOF10: 5 - MODERATE PAIN
PAINLEVEL_OUTOF10: 3
PAINLEVEL_OUTOF10: 0 - NO PAIN
PAINLEVEL_OUTOF10: 5 - MODERATE PAIN

## 2024-12-20 ASSESSMENT — PAIN DESCRIPTION - LOCATION: LOCATION: VAGINA

## 2024-12-20 NOTE — PERIOPERATIVE NURSING NOTE
1245 Arrival to phase 2. Awake and alert. No complaints. Ambulated to bathroom and voided without difficulty. Peripad changed, small amnt of bloody drainage on pad. Meds to Beds at bedside to deliver medications.     1300 PIV removed.     1319 Discharge instructions reviewed with pt and , verbalized understanding. States she wants to talk to Dr. Cornejo before she goes home, message sent.

## 2024-12-20 NOTE — ANESTHESIA PROCEDURE NOTES
Airway  Date/Time: 12/20/2024 9:05 AM  Urgency: elective    Airway not difficult    Staffing  Performed: NADJA   Authorized by: Patricio Delgado MD    Performed by: ARTHUR Barron  Patient location during procedure: OR    Indications and Patient Condition  Indications for airway management: anesthesia  Spontaneous ventilation: present  Sedation level: deep  Preoxygenated: yes  Patient position: sniffing  Mask difficulty assessment: 1 - vent by mask    Final Airway Details  Final airway type: endotracheal airway      Successful airway: ETT  Cuffed: yes   Successful intubation technique: direct laryngoscopy  Facilitating devices/methods: intubating stylet and cricoid pressure  Endotracheal tube insertion site: oral  Blade: Jayde  Blade size: #3  ETT size (mm): 7.0  Cormack-Lehane Classification: grade IIb - view of arytenoids or posterior of glottis only  Number of attempts at approach: 2  Ventilation between attempts: BVM

## 2024-12-20 NOTE — H&P
Surgical Pre-Op H&P    History Of Present Illness  Trinh Jasso is a 29 y.o. female presenting for scheduled surgery: hysteroscopic myomectomy. Her prior myomectomy was incomplete due to reaching the fluid deficit      Past Medical History  She has a past medical history of Anxiety.    Surgical History  Hysteroscopic myomectomy     OB History   (hx SAB x2, one this past ). No hx D&C. Heavy bleeding with her SAB.   Hx  x 3- conceived coming off OCPs.     Review of Systems   All other systems reviewed and are negative.       Physical Exam  Vitals reviewed.   Constitutional:       Appearance: Normal appearance.   HENT:      Head: Normocephalic.   Cardiovascular:      Rate and Rhythm: Normal rate.   Pulmonary:      Effort: Pulmonary effort is normal.   Abdominal:      Palpations: Abdomen is soft.      Tenderness: There is no abdominal tenderness. There is no guarding or rebound.   Neurological:      Mental Status: She is alert and oriented to person, place, and time.          Last Recorded Vitals: to be done on admission    Relevant Results  Medications  No current facility-administered medications for this encounter.    Current Outpatient Medications:     acetaminophen (Tylenol) 325 mg tablet, Take 2 tablets (650 mg) by mouth every 6 hours if needed for mild pain (1 - 3) for up to 20 doses., Disp: 20 tablet, Rfl: 0    clonazePAM (KlonoPIN) 0.5 mg tablet, Take 1 tablet (0.5 mg) by mouth 2 times a day., Disp: , Rfl:     escitalopram (Lexapro) 10 mg tablet, Take 1.5 tablets (15 mg) by mouth once daily., Disp: , Rfl:     etonogestreL-ethinyl estradioL (NuvaRing) 0.12-0.015 mg/24 hr vaginal ring, Insert vaginally and leave in place for 3 consecutive weeks, then remove for 1 week., Disp: 1 each, Rfl: 12    ibuprofen 600 mg tablet, Take 1 tablet (600 mg) by mouth every 6 hours if needed for moderate pain (4 - 6) for up to 20 doses., Disp: 20 tablet, Rfl: 0    ondansetron (Zofran) 4 mg tablet, Take 1 tablet (4  "mg) by mouth every 6 hours if needed for nausea for up to 20 doses., Disp: 20 tablet, Rfl: 0    polyethylene glycol (Glycolax, Miralax) 17 gram packet, Take 17 g by mouth once daily as needed (for constipation) for up to 10 doses., Disp: 10 packet, Rfl: 0    Labs  CBC  No results found for: \"WBC\", \"NRBC\", \"RBC\", \"HGB\", \"HCT\", \"MCV\", \"MCH\", \"MCHC\", \"RDW\", \"PLT\", \"MPV\"    CMP  No results found for: \"GLUCOSE\", \"NA\", \"K\", \"CL\", \"CO2\", \"ANIONGAP\", \"BUN\", \"CREATININE\", \"EGFR\", \"CALCIUM\", \"ALBUMIN\", \"ALKPHOS\", \"PROT\", \"AST\", \"BILITOT\", \"ALT\"    Coagulation   No results found for: \"PROTIME\", \"INR\", \"APTT\", \"FIBRINOGEN\"    Pregnancy Tests  No results found for: \"HCGQUANT\", \"HCGURINE\"    Imaging   No results found for this or any previous visit.       Assessment/Plan   Assessment & Plan  Submucous myoma of uterus      To OR for second part of hysteroscopic myomectomy    Mary Ellis MD PGY 6  Reproductive Endocrinology and Infertility Fellow    "

## 2024-12-20 NOTE — NURSING NOTE
1140: Patient arrival to PACU, report received/care assumed.    1147: Family updated via Epic messenger    1155: Patient tolerating sips of water    1159: Dilaudid 0.25 mg IV PRN given per order    1212: Dr. Cornejo at bedside speaking with patient    1234: Handoff report given to MARIAM Salinas

## 2024-12-20 NOTE — ANESTHESIA POSTPROCEDURE EVALUATION
Patient: Trinh Jasso    Procedure Summary       Date: 12/20/24 Room / Location: [facility] A OR 04 / Virtual U A OR    Anesthesia Start: 0850 Anesthesia Stop: 1145    Procedure: Hysteroscopy; Uterine Myomectomy (Pelvis) Diagnosis:       Submucous myoma of uterus      (Submucous myoma of uterus [D25.0])    Surgeons: Mariana Cornejo MD Responsible Provider: Patricio Delgado MD    Anesthesia Type: general ASA Status: 2            Anesthesia Type: general    Vitals Value Taken Time   /64 12/20/24 1231   Temp 36 °C (96.8 °F) 12/20/24 1140   Pulse 89 12/20/24 1235   Resp 10 12/20/24 1230   SpO2 98 % 12/20/24 1235   Vitals shown include unfiled device data.    Anesthesia Post Evaluation    Patient location during evaluation: PACU  Patient participation: complete - patient participated  Level of consciousness: awake  Pain management: adequate  Airway patency: patent  Cardiovascular status: acceptable  Respiratory status: acceptable  Hydration status: acceptable  Postoperative Nausea and Vomiting: none    No notable events documented.

## 2024-12-20 NOTE — OP NOTE
OPERATIVE REPORT    Patient Name: Trinh Jasso    MRN: [id number]  Log ID: [id number]    Surgery Date: 12/20/2024       * Mariana Cornejo - Primary    Pre-op Diagnosis: Type 2 fibroid    Post-op Diagnosis: same    Procedure(s): Hysteroscopic myomectomy    Anesthesia: general anesthesia    Findings: 2-3 cm fibroid located in right lateral wall  Normal uterine cavity otherwise    Estimated Blood Loss: 20 cc    IVF: 1200 cc    UOP: 1500 cc    Specimens: uterine fibroid    Complications: None    Indications: This is a 29 y.o. who presents for second stage hysteroscopic myomectomy.  The proposed procedure, risks, benefits, and alternatives were discussed with the patient in detail including bleeding, infection, and damage to surrounding structures.  All the patient's questions were answered, and the patient voiced understanding.  The patient desires to proceed with surgery. The consents were signed.       OPERATIVE PROCEDURE:  Patient was taken to the OR, placed on the operative table in dorsolithotomy position with Leonard stirrups.  Patient was prepped and draped in the usual fashion.  A lynch was placed in the bladder. A sterile speculum was placed into the patient's vagina and cervix was grasped anteriorly with a single-toothed tenaculum.  The cervix was then sterilely  dilated with Rick dilators to allow passage of the aveta hysteroscope. The cervix was confirmed to be adequately dilated to allow passage of the  hysteroscope. A an aveta hysteroscope was inserted through the cervix, into the uterine cavity.  The uterus was distended with saline fluid. At this point, there was noted to be a approximately 3 cm FIGO type 3.  The fibroid was adequately resected using the aveta hysteroscope with ultrasound guidance and no additional myoma was noted on survey. The fluid deficit was less than 2000 ml. Images were taken and placed in the medical record. Inspection, at this point, of endometrial cavity revealed that it was  clear.  Tubal ostium were identified bilaterally.  There was no evidence at this  point of uterine perforation.      Aveta hysteroscope was removed. Steps according to the Juveena trial were completed. There was minimal bleeding noted after a  Ray-Hayden was used to clean the vaginal vault and good hemostasis was noted.  All equipment was removed from the vaginal vault. Patient tolerated the procedure well and was taken to the PACU in stable condition.      Counts: Sponge, Lap, and Needle counts correct x 2 at the conclusion of the case. Dr. Cornejo was present and scrubbed for the entire procedure.    Vaginal sweep was performed and was negative    Signature: Mary Ellis MD  Date: December 20, 2024  Time: 11:47 AM     Attestation: I was present for the entire procedure.   Mariana Cornejo MD

## 2024-12-20 NOTE — ANESTHESIA PREPROCEDURE EVALUATION
Patient: Trinh Jasso    Procedure Information       Date/Time: 12/20/24 2328    Procedures:       Hysteroscopy; Uterine Myomectomy (Pelvis)      Possible Laparoscopic Myomectomy (Pelvis)    Location: [facility] A OR 04 / Virtual [facility] A OR    Surgeons: Mariana Cornejo MD            Relevant Problems   GYN   (+) Submucous myoma of uterus       Clinical information reviewed:                   NPO Detail:  No data recorded     Physical Exam    Airway  Mallampati: I  Neck ROM: full     Cardiovascular - normal exam     Dental - normal exam     Pulmonary - normal exam     Abdominal - normal exam         Anesthesia Plan    History of general anesthesia?: no  History of complications of general anesthesia?: no    ASA 2     general     The patient is not a current smoker.

## 2025-01-02 ENCOUNTER — TELEPHONE (OUTPATIENT)
Dept: ENDOCRINOLOGY | Facility: CLINIC | Age: 30
End: 2025-01-02

## 2025-01-02 DIAGNOSIS — Z01.812 ENCOUNTER FOR PREPROCEDURAL LABORATORY EXAMINATION: ICD-10-CM

## 2025-01-02 DIAGNOSIS — Z31.41 FERTILITY TESTING: ICD-10-CM

## 2025-01-02 NOTE — TELEPHONE ENCOUNTER
Returned call to patient. Patient called today about scheduling her follow up hysteroscopy after her myomectomy done 12/20. She states she is supposed to have follow up hysteroscopy done 6-10 weeks following procedure and needs an order in to schedule. Order pended to Dr. Cornejo. Patient states that this hyster should be no cost her her or go through her insurance since she is part of a research group. Patient lives in [place], she states her insurance changed and no longer covers anything out of state. She is wondering if she needed intervention at the time of the hysteroscopy would that also be covered under the research group or would that be something she would have to pay for. I instructed her to reach out to her insurance company as well to see what is and is not covered. Patient also inquiring if she needs to attend her VPOV on Monday 1/6 because the insurance has changed and she does not have out of state coverage. She is still having bleeding after her myomectomy. Bleeding is not heavy, patient instructed to monitor bleeding and contact office if this worsens. Message sent to Dr. Cornejo about patient's questions and  concerns. Will contact patient once provider responds.  Arelis Freed 01/02/25 10:23 AM

## 2025-01-02 NOTE — TELEPHONE ENCOUNTER
Reason for call:   Notes: pt states that she needs a second look hysteroscopy. Pt wants a call back

## 2025-01-03 ENCOUNTER — TELEPHONE (OUTPATIENT)
Dept: ENDOCRINOLOGY | Facility: CLINIC | Age: 30
End: 2025-01-03

## 2025-01-03 NOTE — TELEPHONE ENCOUNTER
Patient called into triage she verified her name and date of birth I scheduled the patient for Feb 11 for her procedure I asked the patient how her bleeding is doing She said it is intermittent it ranges from spotting to light bleeding I advised this is normal and it can continue until the end of Jan Patient denies shortness of breath chest pain headache or dizziness Patient was advised to call if she gets any of the above symptoms or is soaking  apad an hour Patient verbalzied an understanding    NICOLAS CHOWDHURY 01/03/25 12:24 PM

## 2025-01-03 NOTE — TELEPHONE ENCOUNTER
Good morning,     Trinh would like to schedule her [facility] as soon as possible.  She wanted to discuss anesthesia with Dr. Cornejo as she would like to bring her kids this trip, but is concerned if she has anesthesia, she will be very tired over the next fews days after the procedure.  Her study window  for the [facility] is 1/31 to 2/28. She has to make travel arrangements so wanted to schedule as soon as possible.   Thanks! Cleo       I had already told her no anesthesia is needed- we should be able to accommodate that window. Nicolas, what works to get her in with me? She has a birth control ring in.          Called the patient she verified her name and date of birth I gave her  the dates Dr Cornejo is available She will call me back     NICOLAS CHOWDHURY 01/03/25 10:32 AM

## 2025-01-03 NOTE — TELEPHONE ENCOUNTER
MD Arelis Pérez RN; Megan Valentine, RN  Hi Arelis,  Yes, she is part of a research group so the procedure should be covered by that.  She does not need a POV Monday. She needs to just keep using her birth control. Bleeding is common.  She just needs lots of reassurance usually :)  Mariana            ----- Message -----  From: Arelis Freed RN  Sent: 1/2/2025  10:16 AM EST  To: Mariana Cornejo MD; Megan Valentine, RN  Subject: question                                        Hi Dr. Cornejo,    This patient called today about scheduling her follow up hysteroscopy. There was no order in so I will pend one to you but she was saying that this would be no cost her her or go through her insurance since she is part of a research group. She states her insurance changed and no longer covers anything out of state, she lives in [place]. She is wondering though if she needed intervention at the time of the hysteroscopy would that also be covered under the research group or would that be something she would have to pay for. I told her to reach out to her insurance company as well to see what is and is not covered.    She also said she spoke with someone (not sure who, I don't see any notes) about if she needed to attend her VPOV on Monday 1/6 because the insurance has changed and she does not have out of state coverage. She is still having bleeding after her myomectomy, not heavy. We told her to keep an eye on it. Assuming she should attend the post op visit but wanted to check with you.    Thank you        POV visit already cancelled by  and patient notified. Patient also discussed second look hysteroscopy scheduling and bleeding with Vanna Freed 01/03/25 1:33 PM

## 2025-01-06 ENCOUNTER — APPOINTMENT (OUTPATIENT)
Dept: ENDOCRINOLOGY | Facility: CLINIC | Age: 30
End: 2025-01-06

## 2025-01-09 ENCOUNTER — TELEPHONE (OUTPATIENT)
Dept: OBSTETRICS AND GYNECOLOGY | Facility: CLINIC | Age: 30
End: 2025-01-09

## 2025-01-09 NOTE — TELEPHONE ENCOUNTER
Follow Up phone call for 3 week study follow up for [facility] trial.  Patient reports she has had two episodes of expulsion of a yellowish/white thick substance from her vagina on 1/5 and 1/7.  No further instances have occurred since the 7th.  We discussed this is expected as related to the trial and she verbally understands. She also experienced some abdominal cramping as the time of the expulsions which have since subsided. She states light bleeding persists. She understands this is expected up to 4 weeks after the procedure and reassurance given.  She will continue to monitor and let us know of any further concerns or worsening.  She is still completing the study diary and will send what she has completed to date via email. Study visit completed per protocol and stipend sent.     Head is atraumatic. Head shape is symmetrical.

## 2025-01-20 ENCOUNTER — APPOINTMENT (OUTPATIENT)
Dept: ENDOCRINOLOGY | Facility: CLINIC | Age: 30
End: 2025-01-20
Payer: COMMERCIAL

## 2025-01-29 ENCOUNTER — PREP FOR PROCEDURE (OUTPATIENT)
Dept: ENDOCRINOLOGY | Facility: CLINIC | Age: 30
End: 2025-01-29

## 2025-01-29 DIAGNOSIS — Z00.6 RESEARCH EXAM: ICD-10-CM

## 2025-01-29 RX ORDER — KETOROLAC TROMETHAMINE 30 MG/ML
30 INJECTION, SOLUTION INTRAMUSCULAR; INTRAVENOUS ONCE AS NEEDED
Status: CANCELLED | OUTPATIENT
Start: 2025-01-29 | End: 2025-02-03

## 2025-01-29 RX ORDER — ACETAMINOPHEN 325 MG/1
650 TABLET ORAL ONCE AS NEEDED
Status: CANCELLED | OUTPATIENT
Start: 2025-01-29

## 2025-01-30 ENCOUNTER — PREP FOR PROCEDURE (OUTPATIENT)
Dept: ENDOCRINOLOGY | Facility: CLINIC | Age: 30
End: 2025-01-30

## 2025-01-31 ENCOUNTER — HOSPITAL ENCOUNTER (OUTPATIENT)
Dept: ENDOCRINOLOGY | Facility: CLINIC | Age: 30
Discharge: HOME | End: 2025-01-31

## 2025-01-31 ENCOUNTER — LAB (OUTPATIENT)
Dept: LAB | Facility: HOSPITAL | Age: 30
End: 2025-01-31
Payer: COMMERCIAL

## 2025-01-31 VITALS
TEMPERATURE: 97.7 F | SYSTOLIC BLOOD PRESSURE: 125 MMHG | OXYGEN SATURATION: 97 % | RESPIRATION RATE: 18 BRPM | BODY MASS INDEX: 27.55 KG/M2 | HEIGHT: 64 IN | HEART RATE: 65 BPM | DIASTOLIC BLOOD PRESSURE: 84 MMHG | WEIGHT: 161.4 LBS

## 2025-01-31 DIAGNOSIS — Z31.41 FERTILITY TESTING: ICD-10-CM

## 2025-01-31 DIAGNOSIS — Z00.6 RESEARCH EXAM: Primary | ICD-10-CM

## 2025-01-31 LAB
ALBUMIN SERPL BCP-MCNC: 4.1 G/DL (ref 3.4–5)
ALP SERPL-CCNC: 81 U/L (ref 33–110)
ALT SERPL W P-5'-P-CCNC: 14 U/L (ref 7–45)
ANION GAP SERPL CALC-SCNC: 11 MMOL/L (ref 10–20)
AST SERPL W P-5'-P-CCNC: 20 U/L (ref 9–39)
BASOPHILS # BLD AUTO: 0.02 X10*3/UL (ref 0–0.1)
BASOPHILS NFR BLD AUTO: 0.5 %
BILIRUB SERPL-MCNC: 0.3 MG/DL (ref 0–1.2)
BUN SERPL-MCNC: 11 MG/DL (ref 6–23)
CALCIUM SERPL-MCNC: 9.2 MG/DL (ref 8.6–10.3)
CHLORIDE SERPL-SCNC: 103 MMOL/L (ref 98–107)
CO2 SERPL-SCNC: 30 MMOL/L (ref 21–32)
CREAT SERPL-MCNC: 0.69 MG/DL (ref 0.5–1.05)
EGFRCR SERPLBLD CKD-EPI 2021: >90 ML/MIN/1.73M*2
EOSINOPHIL # BLD AUTO: 0.02 X10*3/UL (ref 0–0.7)
EOSINOPHIL NFR BLD AUTO: 0.5 %
ERYTHROCYTE [DISTWIDTH] IN BLOOD BY AUTOMATED COUNT: 12.5 % (ref 11.5–14.5)
GLUCOSE SERPL-MCNC: 93 MG/DL (ref 74–99)
HCT VFR BLD AUTO: 39.9 % (ref 36–46)
HGB BLD-MCNC: 13.2 G/DL (ref 12–16)
IMM GRANULOCYTES # BLD AUTO: 0.02 X10*3/UL (ref 0–0.7)
IMM GRANULOCYTES NFR BLD AUTO: 0.5 % (ref 0–0.9)
LYMPHOCYTES # BLD AUTO: 1.61 X10*3/UL (ref 1.2–4.8)
LYMPHOCYTES NFR BLD AUTO: 36.8 %
MCH RBC QN AUTO: 28.8 PG (ref 26–34)
MCHC RBC AUTO-ENTMCNC: 33.1 G/DL (ref 32–36)
MCV RBC AUTO: 87 FL (ref 80–100)
MONOCYTES # BLD AUTO: 0.45 X10*3/UL (ref 0.1–1)
MONOCYTES NFR BLD AUTO: 10.3 %
NEUTROPHILS # BLD AUTO: 2.26 X10*3/UL (ref 1.2–7.7)
NEUTROPHILS NFR BLD AUTO: 51.4 %
NRBC BLD-RTO: 0 /100 WBCS (ref 0–0)
PLATELET # BLD AUTO: 238 X10*3/UL (ref 150–450)
POTASSIUM SERPL-SCNC: 4.1 MMOL/L (ref 3.5–5.3)
PREGNANCY TEST URINE, POC: NEGATIVE
PROT SERPL-MCNC: 7.2 G/DL (ref 6.4–8.2)
RBC # BLD AUTO: 4.59 X10*6/UL (ref 4–5.2)
SODIUM SERPL-SCNC: 140 MMOL/L (ref 136–145)
WBC # BLD AUTO: 4.4 X10*3/UL (ref 4.4–11.3)

## 2025-01-31 PROCEDURE — 58555 HYSTEROSCOPY DX SEP PROC: CPT | Performed by: OBSTETRICS & GYNECOLOGY

## 2025-01-31 PROCEDURE — 85025 COMPLETE CBC W/AUTO DIFF WBC: CPT

## 2025-01-31 PROCEDURE — 80053 COMPREHEN METABOLIC PANEL: CPT

## 2025-01-31 RX ORDER — KETOROLAC TROMETHAMINE 30 MG/ML
30 INJECTION, SOLUTION INTRAMUSCULAR; INTRAVENOUS ONCE AS NEEDED
Status: DISCONTINUED | OUTPATIENT
Start: 2025-01-31 | End: 2025-02-01 | Stop reason: HOSPADM

## 2025-01-31 RX ORDER — ACETAMINOPHEN 325 MG/1
650 TABLET ORAL ONCE AS NEEDED
Status: DISCONTINUED | OUTPATIENT
Start: 2025-01-31 | End: 2025-02-01 | Stop reason: HOSPADM

## 2025-01-31 ASSESSMENT — PAIN SCALES - GENERAL: PAINLEVEL_OUTOF10: 0 - NO PAIN

## 2025-01-31 ASSESSMENT — PAIN - FUNCTIONAL ASSESSMENT: PAIN_FUNCTIONAL_ASSESSMENT: 0-10

## 2025-01-31 NOTE — ADDENDUM NOTE
Encounter addended by: Amelia Jerome RN on: 1/31/2025 2:36 PM   Actions taken: SmartForm saved, Procedure Event Log accessed, Event accepted in Narrator

## 2025-01-31 NOTE — PROGRESS NOTES
Patient ID: Trinh Jasso is a 29 y.o. female.    Hysteroscopy diagnostic    Date/Time: 1/31/2025 2:22 PM    Performed by: Mariana Cornejo MD  Authorized by: Mariana Cornejo MD    Consent:     Consent obtained:  Verbal and written    Consent given by:  Patient    Risks, benefits, and alternatives were discussed: yes      Risks discussed:  Bleeding, infection and pain  Universal protocol:     Procedure explained and questions answered to patient or proxy's satisfaction: yes      Relevant documents present and verified: yes      Test results available: yes      Imaging studies available: yes      Required blood products, implants, devices, and special equipment available: yes      Immediately prior to procedure, a time out was called: yes      Patient identity confirmed:  Verbally with patient, arm band and hospital-assigned identification number  Pre-procedure details:     Skin preparation:  Povidone-iodine  Procedure specific details:      Procedure: Diagnostic Hysteroscopy   Preop diagnosis: Fertility testing  Post op diagnosis: Same   Assistant: None    Anesthesia: None   IV: None   EBL: 3 cc  Specimens: None   Complications: None   Risks benefits and alternatives of the procedure explained to the patient and informed consent was obtained. Urine pregnancy test was performed and was negative. Time out was performed. The patient was placed in the dorsal lithotomy position and a sterile speculum was placed in the vagina. The cervix was sterilized with Betadine x3. Paracervical block with lidocaine 1% was administered.   Tenaculum: YES  Dilation: NO  The hysteroscope was placed in the cervix and advanced into the uterine cavity. Normal saline was used for distension media. Images were obtained and findings noted as below.   All instruments were then removed. Good hemostasis was noted. Patient tolerated the procedure well returned to the recovery area in stable condition. .   Findings:   Cavity: Smooth cavity no  lesions noted, well healing area right uterine wall, no adhesions  Ostia: Bilateral tubal ostia visualized  Additional Notes:  Mariana Cornejo 01/31/25 2:30 PM              Post-procedure details:     Procedure completion:  Tolerated well, no immediate complications

## 2025-02-11 ENCOUNTER — APPOINTMENT (OUTPATIENT)
Dept: ENDOCRINOLOGY | Facility: CLINIC | Age: 30
End: 2025-02-11

## (undated) DEVICE — GLOVE, SURGICAL, PROTEXIS PI BLUE W/NEUTHERA, 7.0, PF, LF

## (undated) DEVICE — PAD, SANITARY, OBSTETRICAL, W/ADHSV STRIP,11 IN,LF

## (undated) DEVICE — SYRINGE, 10 CC, LUER LOCK

## (undated) DEVICE — SOLIDIFIER, KWIK-SORB, 1200CC

## (undated) DEVICE — TRAY, FOLEY, LUBRI-SIL, 16FR, COMPLETE CARE W/STATLOCK

## (undated) DEVICE — Device

## (undated) DEVICE — NEEDLE, HYPODERMIC, MONOJECT, 18 G X 1.5 IN

## (undated) DEVICE — SOLUTION, SCRUB EXIDINE, 4% CHG, 4 OZ

## (undated) DEVICE — ACCESSORY, AVETA, WASTE MANAGEMENT WITH CAP

## (undated) DEVICE — TUBING, SUCTION, NON-CONDUCTIVE, W/CONNECT,.25 IN X 12 FT, STERILE, LF

## (undated) DEVICE — DRAPE PACK, LAVH, W/ATTACHED LEGGINGS, W/POUCH, 100 X 114 IN, LF, STERILE

## (undated) DEVICE — DEVICE, RESECTING, AVETA WAVE, 3.9MM

## (undated) DEVICE — ACCESSORY, FLUID MANAGEMENT, AVETA

## (undated) DEVICE — SYRINGE, 50 CC, LUER LOCK

## (undated) DEVICE — BRIEF, CURITY, XLARGE, MESH

## (undated) DEVICE — BAG, DECANTER

## (undated) DEVICE — NEEDLE, HYPODERMIC, REGULAR WALL, REGULAR BEVEL, 18 G X 1.5 IN